# Patient Record
Sex: FEMALE | Race: OTHER | HISPANIC OR LATINO | URBAN - METROPOLITAN AREA
[De-identification: names, ages, dates, MRNs, and addresses within clinical notes are randomized per-mention and may not be internally consistent; named-entity substitution may affect disease eponyms.]

---
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
## 2025-05-15 ENCOUNTER — OFFICE VISIT (OUTPATIENT)
Dept: URGENT CARE | Facility: CLINIC | Age: 20
End: 2025-05-15
Payer: MEDICAID

## 2025-05-15 VITALS
HEIGHT: 60 IN | WEIGHT: 105.9 LBS | BODY MASS INDEX: 20.79 KG/M2 | RESPIRATION RATE: 14 BRPM | OXYGEN SATURATION: 100 % | DIASTOLIC BLOOD PRESSURE: 70 MMHG | HEART RATE: 104 BPM | SYSTOLIC BLOOD PRESSURE: 102 MMHG | TEMPERATURE: 98.9 F

## 2025-05-15 DIAGNOSIS — K04.7 DENTAL ABSCESS: Primary | ICD-10-CM

## 2025-05-15 PROCEDURE — 3078F DIAST BP <80 MM HG: CPT | Performed by: PHYSICIAN ASSISTANT

## 2025-05-15 PROCEDURE — 3074F SYST BP LT 130 MM HG: CPT | Performed by: PHYSICIAN ASSISTANT

## 2025-05-15 PROCEDURE — 99203 OFFICE O/P NEW LOW 30 MIN: CPT | Performed by: PHYSICIAN ASSISTANT

## 2025-05-15 RX ORDER — OXYCODONE AND ACETAMINOPHEN 5; 325 MG/1; MG/1
1 TABLET ORAL
COMMUNITY
End: 2025-05-15

## 2025-05-15 RX ORDER — ONDANSETRON 4 MG/1
4 TABLET, FILM COATED ORAL
COMMUNITY
End: 2025-05-15

## 2025-05-15 RX ORDER — SWAB
1 SWAB, NON-MEDICATED MISCELLANEOUS
COMMUNITY
End: 2025-05-15

## 2025-05-15 RX ORDER — DOXYLAMINE SUCCINATE AND PYRIDOXINE HYDROCHLORIDE, DELAYED RELEASE TABLETS 10 MG/10 MG 10; 10 MG/1; MG/1
2 TABLET, DELAYED RELEASE ORAL
COMMUNITY
End: 2025-05-15

## 2025-05-15 ASSESSMENT — ENCOUNTER SYMPTOMS
CHILLS: 0
NAUSEA: 0
FEVER: 0
DIARRHEA: 0
HEADACHES: 0
SINUS PRESSURE: 0
VOMITING: 0
ABDOMINAL PAIN: 0

## 2025-05-15 NOTE — PROGRESS NOTES
Subjective     Jammie Zuñiga is a 20 y.o. female who presents with Dental Pain (R lower, abscess x 2 days )            Dental Pain   This is a new problem. Episode onset: 2 days. The problem occurs constantly. The problem has been gradually worsening. The pain is moderate. Associated symptoms include facial pain. Pertinent negatives include no difficulty swallowing, fever, oral bleeding, sinus pressure or thermal sensitivity. She has tried NSAIDs for the symptoms. The treatment provided moderate relief.       Past Medical History[1]      Past Surgical History[2]    No family history on file.    Allergies:  Pegaspargase and Benadryl allergy    Medications, Allergies, and current problem list reviewed today in Epic      Review of Systems   Constitutional:  Negative for chills, fever and malaise/fatigue.   HENT:  Negative for sinus pressure.         Right lower tooth pain   Gastrointestinal:  Negative for abdominal pain, diarrhea, nausea and vomiting.   Neurological:  Negative for headaches.              Objective     /70 (BP Location: Right arm, Patient Position: Sitting, BP Cuff Size: Small adult)   Pulse (!) 104   Temp 37.2 °C (98.9 °F) (Temporal)   Resp 14   Ht 1.524 m (5')   Wt 48 kg (105 lb 14.4 oz)   SpO2 100%   BMI 20.68 kg/m²      Physical Exam  Constitutional:       General: She is not in acute distress.     Appearance: She is not ill-appearing.   HENT:      Head: Normocephalic and atraumatic.      Mouth/Throat:      Mouth: Mucous membranes are moist.     Cardiovascular:      Rate and Rhythm: Regular rhythm. Tachycardia present.   Pulmonary:      Effort: Pulmonary effort is normal. No respiratory distress.      Breath sounds: No stridor. No wheezing.   Skin:     General: Skin is warm and dry.   Neurological:      General: No focal deficit present.      Mental Status: She is alert and oriented to person, place, and time.   Psychiatric:         Mood and Affect: Mood normal.         Behavior:  Behavior normal.         Thought Content: Thought content normal.         Judgment: Judgment normal.                                  Assessment & Plan  Dental abscess    Orders:    amoxicillin-clavulanate (AUGMENTIN) 875-125 MG Tab; Take 1 Tablet by mouth 2 times a day for 10 days.     Differential diagnoses, Supportive care, and indications for immediate follow-up discussed with patient.   Pathogenesis of diagnosis discussed including typical length and natural progression.   Instructed to return to clinic or nearest emergency department for any change in condition, further concerns, or worsening of symptoms.    .The patient demonstrated a good understanding and agreed with the treatment plan.    Mirian Dasilva P.A.-C.                   [1]   Past Medical History:  Diagnosis Date    Cancer (HCC)     lymphoma    Depression 10/11/2021   [2]   Past Surgical History:  Procedure Laterality Date    PRIMARY C SECTION Bilateral 10/22/2021    Procedure:  SECTION, PRIMARY;  Surgeon: Noemi Zimmerman M.D.;  Location: SURGERY LABOR AND DELIVERY;  Service: Obstetrics    CATH REMOVAL Right 2016    Procedure: CATH REMOVAL PORT;  Surgeon: Charu Piper M.D.;  Location: SURGERY [facility];  Service:     OTHER      lump biobsy

## (undated) DEVICE — CATHETER IV NON-SAFETY 18 GA X 1 1/4 (50/BX 4BX/CA)

## (undated) DEVICE — CHLORAPREP 26 ML APPLICATOR - ORANGE TINT(25/CA)

## (undated) DEVICE — SUTURE 0 VICRYL PLUS CT 36 (36PK/BX)"

## (undated) DEVICE — KIT  I.V. START (100EA/CA)

## (undated) DEVICE — SPONGE GAUZESTER 4 X 4 4PLY - (128PK/CA)

## (undated) DEVICE — SUTURE 0 VICRYL PLUS CT-1 - 36 INCH (36/BX)

## (undated) DEVICE — GLOVE BIOGEL SZ 8 SURGICAL PF LTX - (50PR/BX 4BX/CA)

## (undated) DEVICE — HEAD HOLDER JUNIOR/ADULT

## (undated) DEVICE — ADHESIVE DERMABOND HVD MINI (12EA/BX)

## (undated) DEVICE — SUTURE 2-0 CHROMIC GUT CT-1 27 (36PK/BX)"

## (undated) DEVICE — LACTATED RINGERS INJ 1000 ML - (14EA/CA 60CA/PF)

## (undated) DEVICE — SOLUTION PLASMA-LYTE PH 7.4 INJ 1000ML  (14EA/CA)

## (undated) DEVICE — TAPE CLOTH MEDIPORE 6 INCH - (12RL/CA)

## (undated) DEVICE — DRESSING NON-ADHERING 8 X 3 - (50/BX)

## (undated) DEVICE — PENCIL ELECTSURG 10FT HLSTR - WITH BLADE (50EA/CA)

## (undated) DEVICE — SUTURE 1 CHROMIC CTX ETHICON - (36PK/BX)

## (undated) DEVICE — BLANKET UNDERBODY FULL ACCES - (5/CA)

## (undated) DEVICE — STAPLER SKIN DISP - (6/BX 10BX/CA) VISISTAT

## (undated) DEVICE — SUTURE 3-0 VICRYL PLUS CT-1 - 36 INCH (36/BX)

## (undated) DEVICE — TUBING CLEARLINK DUO-VENT - C-FLO (48EA/CA)

## (undated) DEVICE — PACK C-SECTION (2EA/CA)

## (undated) DEVICE — CANISTER SUCTION 3000ML MECHANICAL FILTER AUTO SHUTOFF MEDI-VAC NONSTERILE LF DISP  (40EA/CA)

## (undated) DEVICE — WATER IRRIGATION STERILE 1000ML (12EA/CA)

## (undated) DEVICE — SODIUM CHL IRRIGATION 0.9% 1000ML (12EA/CA)

## (undated) DEVICE — SET EXTENSION WITH 2 PORTS (48EA/CA) ***PART #2C8610 IS A SUBSTITUTE*****

## (undated) DEVICE — TRAY SPINAL ANESTHESIA NON-SAFETY (10/CA)

## (undated) DEVICE — DRESSING INTERCEED ABSORBABLE ADHESION BARRIER TC7 (10EA/CA)

## (undated) DEVICE — PACK ROOM TURNOVER L&D (12/CA)

## (undated) DEVICE — SLEEVE, SEQUENTIAL CALF REG